# Patient Record
Sex: MALE | Race: WHITE | NOT HISPANIC OR LATINO | Employment: FULL TIME | URBAN - METROPOLITAN AREA
[De-identification: names, ages, dates, MRNs, and addresses within clinical notes are randomized per-mention and may not be internally consistent; named-entity substitution may affect disease eponyms.]

---
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
## 2023-02-28 ENCOUNTER — OFFICE VISIT (OUTPATIENT)
Dept: URGENT CARE | Facility: CLINIC | Age: 23
End: 2023-02-28
Payer: OTHER GOVERNMENT

## 2023-02-28 VITALS
RESPIRATION RATE: 16 BRPM | TEMPERATURE: 97.2 F | DIASTOLIC BLOOD PRESSURE: 74 MMHG | HEIGHT: 71 IN | OXYGEN SATURATION: 96 % | HEART RATE: 99 BPM | BODY MASS INDEX: 38.36 KG/M2 | SYSTOLIC BLOOD PRESSURE: 132 MMHG | WEIGHT: 274 LBS

## 2023-02-28 DIAGNOSIS — R05.3 PERSISTENT COUGH FOR 3 WEEKS OR LONGER: ICD-10-CM

## 2023-02-28 DIAGNOSIS — J22 LRTI (LOWER RESPIRATORY TRACT INFECTION): ICD-10-CM

## 2023-02-28 PROCEDURE — 99203 OFFICE O/P NEW LOW 30 MIN: CPT | Performed by: PHYSICIAN ASSISTANT

## 2023-02-28 RX ORDER — AZITHROMYCIN 250 MG/1
250 TABLET, FILM COATED ORAL DAILY
Qty: 6 TABLET | Refills: 0 | Status: SHIPPED | OUTPATIENT
Start: 2023-02-28 | End: 2024-02-06

## 2023-02-28 RX ORDER — METHYLPREDNISOLONE 4 MG/1
TABLET ORAL
Qty: 21 TABLET | Refills: 0 | Status: SHIPPED | OUTPATIENT
Start: 2023-02-28 | End: 2024-02-06

## 2023-02-28 RX ORDER — GUAIFENESIN 600 MG/1
600 TABLET, EXTENDED RELEASE ORAL EVERY 12 HOURS
COMMUNITY
End: 2024-02-06

## 2023-02-28 ASSESSMENT — ENCOUNTER SYMPTOMS
SINUS PAIN: 1
HEMOPTYSIS: 0
CHILLS: 0
FEVER: 0
HEADACHES: 0
ABDOMINAL PAIN: 0
SWEATS: 0
DIARRHEA: 0
SPUTUM PRODUCTION: 1
SHORTNESS OF BREATH: 1
COUGH: 1
MYALGIAS: 0
WHEEZING: 0
SORE THROAT: 0
NAUSEA: 0
VOMITING: 0

## 2023-02-28 NOTE — PROGRESS NOTES
"Subjective     Juan Miguel Eid is a 22 y.o. male who presents with Cough ([id number], at home negative covid test results )            Cough  This is a new problem. Episode onset: 5 weeks. The problem has been unchanged. The cough is Productive of sputum. Associated symptoms include chest pain (burning in chest with coughing), nasal congestion and shortness of breath. Pertinent negatives include no chills, ear congestion, ear pain, fever (at the beginning of illness. Nothing recently), headaches, hemoptysis, myalgias, postnasal drip, rash, sore throat, sweats or wheezing. The symptoms are aggravated by lying down. He has tried OTC cough suppressant for the symptoms. The treatment provided mild relief. There is no history of asthma, bronchitis or pneumonia.     The patient took an at home COVID test at the beginning of illness and it was negative.     No past medical history on file.      No past surgical history on file.      No family history on file.      Patient has no allergy information on record.    Medications, Allergies, and current problem list reviewed today in Epic      Review of Systems   Constitutional:  Positive for malaise/fatigue. Negative for chills and fever (at the beginning of illness. Nothing recently).   HENT:  Positive for congestion and sinus pain (improved). Negative for ear pain, postnasal drip and sore throat.    Respiratory:  Positive for cough, sputum production and shortness of breath. Negative for hemoptysis and wheezing.    Cardiovascular:  Positive for chest pain (burning in chest with coughing).   Gastrointestinal:  Negative for abdominal pain, diarrhea, nausea and vomiting.   Musculoskeletal:  Negative for myalgias.   Skin:  Negative for rash.   Neurological:  Negative for headaches.      All other systems reviewed and are negative.         Objective     /74   Pulse 99   Temp 36.2 °C (97.2 °F) (Temporal)   Resp 16   Ht 1.803 m (5' 11\")   Wt 124 kg (274 lb)   SpO2 96%   " BMI 38.22 kg/m²      Physical Exam  Constitutional:       General: He is not in acute distress.     Appearance: Normal appearance. He is not ill-appearing.   HENT:      Head: Normocephalic and atraumatic.      Mouth/Throat:      Mouth: Mucous membranes are moist.      Pharynx: No posterior oropharyngeal erythema.   Eyes:      Conjunctiva/sclera: Conjunctivae normal.   Cardiovascular:      Rate and Rhythm: Normal rate and regular rhythm.      Heart sounds: Normal heart sounds.   Pulmonary:      Effort: Pulmonary effort is normal. No respiratory distress.      Breath sounds: Rhonchi (slight rhonchi lower lung fields) present. No wheezing or rales.      Comments: Spasmodic cough   Skin:     General: Skin is warm and dry.   Neurological:      General: No focal deficit present.      Mental Status: He is alert and oriented to person, place, and time.   Psychiatric:         Mood and Affect: Mood normal.         Behavior: Behavior normal.         Thought Content: Thought content normal.         Judgment: Judgment normal.                           Assessment & Plan          1. Persistent cough for 3 weeks or longer  methylPREDNISolone (MEDROL DOSEPAK) 4 MG Tablet Therapy Pack    azithromycin (ZITHROMAX) 250 MG Tab      2. LRTI (lower respiratory tract infection)  methylPREDNISolone (MEDROL DOSEPAK) 4 MG Tablet Therapy Pack    azithromycin (ZITHROMAX) 250 MG Tab          Concerns about possible secondary bacterial respiratory infection with duration of illness. Definitely post-viral cough lung inflammation that will benefit from corticosteroid.      methylPREDNISolone (MEDROL DOSEPAK) 4 MG Tablet Therapy Pack, Follow schedule on package instructions., Disp: 21 Tablet, Rfl: 0    azithromycin (ZITHROMAX) 250 MG Tab, Take 1 Tablet by mouth every day., Disp: 6 Tablet, Rfl: 0        Differential diagnoses, Supportive care, and indications for immediate follow-up discussed with patient.   Pathogenesis of diagnosis discussed  including typical length and natural progression.   Instructed to return to clinic or nearest emergency department for any change in condition, further concerns, or worsening of symptoms.        The patient demonstrated a good understanding and agreed with the treatment plan.      Lisa Carter P.A.-C.

## 2023-02-28 NOTE — LETTER
February 28, 2023         Patient: Juan Miguel Eid   YOB: 2000   Date of Visit: 2/28/2023           To Whom it May Concern:    Juan Miguel Eid was seen in my clinic on 2/28/2023 for medical reasons. He should avoid any fitness testing for 1 week from today while on treatment.     If you have any questions or concerns, please don't hesitate to call.        Sincerely,           Lisa Carter P.A.-C.  Electronically Signed

## 2024-02-05 PROBLEM — M67.431 GANGLION OF RIGHT WRIST: Status: ACTIVE | Noted: 2024-02-05
